# Patient Record
Sex: MALE | URBAN - METROPOLITAN AREA
[De-identification: names, ages, dates, MRNs, and addresses within clinical notes are randomized per-mention and may not be internally consistent; named-entity substitution may affect disease eponyms.]

---

## 2023-04-26 ENCOUNTER — NURSE ONLY (OUTPATIENT)
Age: 53
End: 2023-04-26

## 2023-04-26 VITALS
BODY MASS INDEX: 25.06 KG/M2 | WEIGHT: 185 LBS | TEMPERATURE: 97.9 F | HEIGHT: 72 IN | RESPIRATION RATE: 18 BRPM | OXYGEN SATURATION: 98 % | HEART RATE: 75 BPM

## 2023-04-26 DIAGNOSIS — Z12.11 COLON CANCER SCREENING: Primary | ICD-10-CM

## 2023-04-26 NOTE — PROGRESS NOTES
Colon Screen    Patient: Jonh Resendiz MRN: [id number]  SSN: [id number]    YOB: 1970  Age: 46 y.o. Sex: male        Subjective:   Jonh Resendiz was referred by his PCP, Bonnie Floyd MD.  Patient referred for colonoscopy for   Screening colonoscopy. Patient denies rectal pain or bleeding. Abdominal surgeries as described below, specifically none. Family history as described below, specifically none. Patient has never had a colonoscopy. No Known Allergies    Past Medical History:   Diagnosis Date    Restless legs syndrome 2011    Sleep apnea 2011     No past surgical history on file. Family History   Problem Relation Age of Onset    Unknown Mother      Social History     Tobacco Use    Smoking status: Never    Smokeless tobacco: Never   Substance Use Topics    Alcohol use:  Yes     Alcohol/week: 6.0 standard drinks     Types: 6 Cans of beer per week      Prior to Admission medications    Not on File          Risks colonoscopy described- colon injury, missed lesion, anesthesia problems, bleeding       Rudi Casey LPN  April 26, 6768  3:33 PM

## 2023-12-07 ENCOUNTER — TELEPHONE (OUTPATIENT)
Age: 53
End: 2023-12-07

## 2023-12-11 ENCOUNTER — TELEPHONE (OUTPATIENT)
Age: 53
End: 2023-12-11

## 2023-12-11 NOTE — TELEPHONE ENCOUNTER
Gemini Odell     Ask a question     Referral Details      Print  Auth/order #  [id number]     Case category  Approved      Request Update  [facility]  Patient Information  hide    Name  Adebayo Pickard     Date of birth  1970     [place] [address]     Service area  0124     Sponsor ID  [id number]     Other health insurance  No Other Health Insurance     Patient phone     Pharmacy info  Show RX List     Provider Information  hide    PCM  Sebastian Siddiqi Md  [address] [place] [address]  Phone:  [phone]  Fax:  [phone]  Rendering  Kirk Gardner Md  [facility]  [address] [place] [address]  Phone:  [phone]  Fax:  [phone]  Ordered by  Kirk Gardner Md  [address] [place] [address]  Phone:  [phone]  Fax:  [phone]  [address] [address]  Specialty:  General Medical And Surgical  Phone:  [phone]  Fax:  [phone]  Case authorization information  hide    Case type:  Evaluate and Treat (and other services)  Submitted date:  12/7/2023  Type of service:  General Surgery, Outpatient  Valid dates:  12/16/2023 - 6/12/2024  Processed date:  12/8/2023  [facility]  # of units or visits:  1  Reason for referral:  Encounter Screening Malignant Neoplasm Of C  Initial diagnosis:   - Encounter Screening Malignant Neoplasm O   Service  Units  Between dates     Colonoscopy, Fiberoptic  1  12/16/2023 - 6/12/2024